# Patient Record
Sex: MALE | Race: WHITE | NOT HISPANIC OR LATINO | URBAN - METROPOLITAN AREA
[De-identification: names, ages, dates, MRNs, and addresses within clinical notes are randomized per-mention and may not be internally consistent; named-entity substitution may affect disease eponyms.]

---

## 2022-12-27 ENCOUNTER — EMERGENCY (EMERGENCY)
Facility: HOSPITAL | Age: 62
LOS: 1 days | Discharge: HOME | End: 2022-12-27
Attending: EMERGENCY MEDICINE | Admitting: EMERGENCY MEDICINE
Payer: COMMERCIAL

## 2022-12-27 VITALS
SYSTOLIC BLOOD PRESSURE: 100 MMHG | OXYGEN SATURATION: 100 % | RESPIRATION RATE: 16 BRPM | HEART RATE: 82 BPM | DIASTOLIC BLOOD PRESSURE: 49 MMHG | TEMPERATURE: 98 F

## 2022-12-27 VITALS
SYSTOLIC BLOOD PRESSURE: 105 MMHG | HEIGHT: 65.75 IN | DIASTOLIC BLOOD PRESSURE: 68 MMHG | WEIGHT: 315 LBS | TEMPERATURE: 98 F | RESPIRATION RATE: 18 BRPM | HEART RATE: 81 BPM

## 2022-12-27 LAB
ALBUMIN SERPL ELPH-MCNC: 4.4 G/DL — SIGNIFICANT CHANGE UP (ref 3.3–5)
ALP SERPL-CCNC: 97 U/L — SIGNIFICANT CHANGE UP (ref 40–120)
ALT FLD-CCNC: 11 U/L — SIGNIFICANT CHANGE UP (ref 10–45)
ANION GAP SERPL CALC-SCNC: 14 MMOL/L — SIGNIFICANT CHANGE UP (ref 5–17)
APTT BLD: 32.8 SEC — SIGNIFICANT CHANGE UP (ref 27.5–35.5)
AST SERPL-CCNC: 14 U/L — SIGNIFICANT CHANGE UP (ref 10–40)
BASOPHILS # BLD AUTO: 0.06 K/UL — SIGNIFICANT CHANGE UP (ref 0–0.2)
BASOPHILS NFR BLD AUTO: 0.6 % — SIGNIFICANT CHANGE UP (ref 0–2)
BILIRUB SERPL-MCNC: 0.4 MG/DL — SIGNIFICANT CHANGE UP (ref 0.2–1.2)
BUN SERPL-MCNC: 52 MG/DL — HIGH (ref 7–23)
CALCIUM SERPL-MCNC: 9.6 MG/DL — SIGNIFICANT CHANGE UP (ref 8.4–10.5)
CHLORIDE SERPL-SCNC: 98 MMOL/L — SIGNIFICANT CHANGE UP (ref 96–108)
CO2 SERPL-SCNC: 26 MMOL/L — SIGNIFICANT CHANGE UP (ref 22–31)
CREAT SERPL-MCNC: 2.34 MG/DL — HIGH (ref 0.5–1.3)
EGFR: 31 ML/MIN/1.73M2 — LOW
EOSINOPHIL # BLD AUTO: 1.32 K/UL — HIGH (ref 0–0.5)
EOSINOPHIL NFR BLD AUTO: 12.5 % — HIGH (ref 0–6)
GLUCOSE SERPL-MCNC: 103 MG/DL — HIGH (ref 70–99)
HCT VFR BLD CALC: 43.9 % — SIGNIFICANT CHANGE UP (ref 39–50)
HGB BLD-MCNC: 14.7 G/DL — SIGNIFICANT CHANGE UP (ref 13–17)
IMM GRANULOCYTES NFR BLD AUTO: 0.7 % — SIGNIFICANT CHANGE UP (ref 0–0.9)
INR BLD: 1.11 — SIGNIFICANT CHANGE UP (ref 0.88–1.16)
LYMPHOCYTES # BLD AUTO: 1.99 K/UL — SIGNIFICANT CHANGE UP (ref 1–3.3)
LYMPHOCYTES # BLD AUTO: 18.9 % — SIGNIFICANT CHANGE UP (ref 13–44)
MCHC RBC-ENTMCNC: 29.5 PG — SIGNIFICANT CHANGE UP (ref 27–34)
MCHC RBC-ENTMCNC: 33.5 GM/DL — SIGNIFICANT CHANGE UP (ref 32–36)
MCV RBC AUTO: 88 FL — SIGNIFICANT CHANGE UP (ref 80–100)
MONOCYTES # BLD AUTO: 0.72 K/UL — SIGNIFICANT CHANGE UP (ref 0–0.9)
MONOCYTES NFR BLD AUTO: 6.8 % — SIGNIFICANT CHANGE UP (ref 2–14)
NEUTROPHILS # BLD AUTO: 6.38 K/UL — SIGNIFICANT CHANGE UP (ref 1.8–7.4)
NEUTROPHILS NFR BLD AUTO: 60.5 % — SIGNIFICANT CHANGE UP (ref 43–77)
NRBC # BLD: 0 /100 WBCS — SIGNIFICANT CHANGE UP (ref 0–0)
PLATELET # BLD AUTO: 226 K/UL — SIGNIFICANT CHANGE UP (ref 150–400)
POTASSIUM SERPL-MCNC: 3.8 MMOL/L — SIGNIFICANT CHANGE UP (ref 3.5–5.3)
POTASSIUM SERPL-SCNC: 3.8 MMOL/L — SIGNIFICANT CHANGE UP (ref 3.5–5.3)
PROT SERPL-MCNC: 7.9 G/DL — SIGNIFICANT CHANGE UP (ref 6–8.3)
PROTHROM AB SERPL-ACNC: 13.2 SEC — SIGNIFICANT CHANGE UP (ref 10.5–13.4)
RBC # BLD: 4.99 M/UL — SIGNIFICANT CHANGE UP (ref 4.2–5.8)
RBC # FLD: 13.9 % — SIGNIFICANT CHANGE UP (ref 10.3–14.5)
SARS-COV-2 RNA SPEC QL NAA+PROBE: NEGATIVE — SIGNIFICANT CHANGE UP
SODIUM SERPL-SCNC: 138 MMOL/L — SIGNIFICANT CHANGE UP (ref 135–145)
WBC # BLD: 10.54 K/UL — HIGH (ref 3.8–10.5)
WBC # FLD AUTO: 10.54 K/UL — HIGH (ref 3.8–10.5)

## 2022-12-27 PROCEDURE — 93010 ELECTROCARDIOGRAM REPORT: CPT

## 2022-12-27 PROCEDURE — 85730 THROMBOPLASTIN TIME PARTIAL: CPT

## 2022-12-27 PROCEDURE — 99285 EMERGENCY DEPT VISIT HI MDM: CPT

## 2022-12-27 PROCEDURE — 85025 COMPLETE CBC W/AUTO DIFF WBC: CPT

## 2022-12-27 PROCEDURE — 93005 ELECTROCARDIOGRAM TRACING: CPT

## 2022-12-27 PROCEDURE — 80053 COMPREHEN METABOLIC PANEL: CPT

## 2022-12-27 PROCEDURE — 36415 COLL VENOUS BLD VENIPUNCTURE: CPT

## 2022-12-27 PROCEDURE — 82962 GLUCOSE BLOOD TEST: CPT

## 2022-12-27 PROCEDURE — 85610 PROTHROMBIN TIME: CPT

## 2022-12-27 PROCEDURE — 99284 EMERGENCY DEPT VISIT MOD MDM: CPT

## 2022-12-27 PROCEDURE — 87635 SARS-COV-2 COVID-19 AMP PRB: CPT

## 2022-12-27 RX ORDER — SODIUM CHLORIDE 9 MG/ML
1000 INJECTION INTRAMUSCULAR; INTRAVENOUS; SUBCUTANEOUS ONCE
Refills: 0 | Status: COMPLETED | OUTPATIENT
Start: 2022-12-27 | End: 2022-12-27

## 2022-12-27 RX ADMIN — SODIUM CHLORIDE 1000 MILLILITER(S): 9 INJECTION INTRAMUSCULAR; INTRAVENOUS; SUBCUTANEOUS at 15:29

## 2022-12-27 NOTE — ED PROVIDER NOTE - CONSTITUTIONAL, MLM
Morbidly obese and weak appearing but non toxic, awake, alert, oriented to person, place, time/situation and in no apparent distress. normal...

## 2022-12-27 NOTE — ED PROVIDER NOTE - MDM ORDERS SUBMITTED SELECTION
cotn with antibiotics   f/u with Dr. Galarza  Return to ER /follow up with Primary if symptoms don't resolve or worsen
Labs/EKG

## 2022-12-27 NOTE — ED PROVIDER NOTE - HIV OFFER
----- Message from Siva Santos MD sent at 5/8/2018  2:17 PM CDT -----  INr in acceptable range at 1.9  
INR (no units)   Date Value   05/08/2018 1.9     Note changes to patient anticoag check:  patient reports 2 tablets on MWF and 1 tablet all other days, and  Denies alternating days (as listed on anticoag calender)  Takes same every week.   Missed 5/6/18 dose.     Advised patient to continue same regimen and recheck inr 6/5/18      Patient was notified that their INR result was 1.9.   Patient was instructed to continue taking their Coumadin/Warfarin as follows: continue current regimen (2 tablets on MWF and 1 tablet all other days)  Next INR is due in 4 weeks on 6/5/18.  Patient was instructed to contact us with any unusual bleeding, bruising, any changes in medications, diet or health status and if there are any other questions or concerns.   Patient verbalized understanding of above.      ACC questions answered as negative except one missed dose on 5/6/18  Anticoag calender updated    Please advise if changes should be made  
Left message to call back   
Opt out

## 2022-12-27 NOTE — ED PROVIDER NOTE - CLINICAL SUMMARY MEDICAL DECISION MAKING FREE TEXT BOX
63 y/o M with dizziness and weakness mostly upon standing. Pt has intentional 40 pound weight loss in a couple of months. Pt feels dehydrated, vitals signs are stable. Labs reviewed with a white count of 10, BUN of 52 and creatine of 2.34, this could be prerenal and secondary to dehydration. Pt given fluids and will reevaluate, if improved can discharge. 63 y/o M with dizziness and weakness mostly upon standing. Pt has intentional 40 pound weight loss in a couple of months. Pt feels dehydrated, vitals signs are stable. Labs reviewed with a white count of 10, BUN of 52 and creatine of 2.34, this could be prerenal and secondary to dehydration. Pt given fluids and will reevaluate, if improved can discharge.  Pt understands to follow up with PMD for repeat creatinine.

## 2022-12-27 NOTE — ED PROVIDER NOTE - NSFOLLOWUPINSTRUCTIONS_ED_ALL_ED_FT
Stay hydrated and eat well balanced meals.  Hold lasix for several days to improve kidney function.  Follow up with your PMD for repeat labs including creatinine to ensure improvement in kidney function.  Return to ED with any worsening symptoms or other concerns.        Dehydration, Adult      Dehydration is a condition in which there is not enough water or other fluids in the body. This happens when a person loses more fluids than he or she takes in. Important organs, such as the kidneys, brain, and heart, cannot function without a proper amount of fluids. Any loss of fluids from the body can lead to dehydration.    Dehydration can be mild, moderate, or severe. It should be treated right away to prevent it from becoming severe.      What are the causes?    Dehydration may be caused by:  •Conditions that cause loss of water or other fluids, such as diarrhea, vomiting, or sweating or urinating a lot.      •Not drinking enough fluids, especially when you are ill or doing activities that require a lot of energy.      •Other illnesses and conditions, such as fever or infection.      •Certain medicines, such as medicines that remove excess fluid from the body (diuretics).      •Lack of safe drinking water.      •Not being able to get enough water and food.        What increases the risk?    The following factors may make you more likely to develop this condition:  •Having a long-term (chronic) illness that has not been treated properly, such as diabetes, heart disease, or kidney disease.      •Being 65 years of age or older.      •Having a disability.      •Living in a place that is high in altitude, where thinner, drier air causes more fluid loss.      •Doing exercises that put stress on your body for a long time (endurance sports).        What are the signs or symptoms?    Symptoms of dehydration depend on how severe it is.    Mild or moderate dehydration     •Thirst.      •Dry lips or dry mouth.      •Dizziness or light-headedness, especially when standing up from a seated position.       •Muscle cramps.       •Dark urine. Urine may be the color of tea.       •Less urine or tears produced than usual.      •Headache.      Severe dehydration     •Changes in skin. Your skin may be cold and clammy, blotchy, or pale. Your skin also may not return to normal after being lightly pinched and released.      •Little or no tears, urine, or sweat.      •Changes in vital signs, such as rapid breathing and low blood pressure. Your pulse may be weak or may be faster than 100 beats a minute when you are sitting still.    •Other changes, such as:  •Feeling very thirsty.      •Sunken eyes.      •Cold hands and feet.      •Confusion.      •Being very tired (lethargic) or having trouble waking from sleep.      •Short-term weight loss.      •Loss of consciousness.          How is this diagnosed?    This condition is diagnosed based on your symptoms and a physical exam. You may have blood and urine tests to help confirm the diagnosis.      How is this treated?    Treatment for this condition depends on how severe it is. Treatment should be started right away. Do not wait until dehydration becomes severe. Severe dehydration is an emergency and needs to be treated in a hospital.•Mild or moderate dehydration can be treated at home. You may be asked to:   •Drink more fluids.      •Drink an oral rehydration solution (ORS). This drink helps restore proper amounts of fluids and salts and minerals in the blood (electrolytes).      •Severe dehydration can be treated:  •With IV fluids.      •By correcting abnormal levels of electrolytes. This is often done by giving electrolytes through a tube that is passed through your nose and into your stomach (nasogastric tube, or NG tube).      •By treating the underlying cause of dehydration.          Follow these instructions at home:    Oral rehydration solution     If told by your health care provider, drink an ORS:  •Make an ORS by following instructions on the package.      •Start by drinking small amounts, about ½ cup (120 mL) every 5–10 minutes.      •Slowly increase how much you drink until you have taken the amount recommended by your health care provider.        Eating and drinking                    •Drink enough clear fluid to keep your urine pale yellow. If you were told to drink an ORS, finish the ORS first and then start slowly drinking other clear fluids. Drink fluids such as:  •Water. Do not drink only water. Doing that can lead to hyponatremia, which is having too little salt (sodium) in the body.      •Water from ice chips you suck on.      •Fruit juice that you have added water to (diluted fruit juice).      •Low-calorie sports drinks.        •Eat foods that contain a healthy balance of electrolytes, such as bananas, oranges, potatoes, tomatoes, and spinach.      •Do not drink alcohol.    •Avoid the following:  •Drinks that contain a lot of sugar. These include high-calorie sports drinks, fruit juice that is not diluted, and soda.      •Caffeine.      •Foods that are greasy or contain a lot of fat or sugar.        General instructions    •Take over-the-counter and prescription medicines only as told by your health care provider.      •Do not take sodium tablets. Doing that can lead to having too much sodium in the body (hypernatremia).      •Return to your normal activities as told by your health care provider. Ask your health care provider what activities are safe for you.      •Keep all follow-up visits as told by your health care provider. This is important.        Contact a health care provider if:  •You have muscle cramps, pain, or discomfort, such as:  •Pain in your abdomen and the pain gets worse or stays in one area (localizes).      •Stiff neck.        •You have a rash.      •You are more irritable than usual.      •You are sleepier or have a harder time waking than usual.      •You feel weak or dizzy.      •You feel very thirsty.        Get help right away if you have:    •Any symptoms of severe dehydration.    •Symptoms of vomiting, such as:  •You cannot eat or drink without vomiting.      •Vomiting gets worse or does not go away.      •Vomit includes blood or green matter (bile).        •Symptoms that get worse with treatment.      •A fever.      •A severe headache.    •Problems with urination or bowel movements, such as:  •Diarrhea that gets worse or does not go away.      •Blood in your stool (feces). This may cause stool to look black and tarry.      •Not urinating, or urinating only a small amount of very dark urine, within 6–8 hours.        •Trouble breathing.      These symptoms may represent a serious problem that is an emergency. Do not wait to see if the symptoms will go away. Get medical help right away. Call your local emergency services (911 in the [place] Do not drive yourself to the hospital.       Summary    •Dehydration is a condition in which there is not enough water or other fluids in the body. This happens when a person loses more fluids than he or she takes in.      •Treatment for this condition depends on how severe it is. Treatment should be started right away. Do not wait until dehydration becomes severe.      •Drink enough clear fluid to keep your urine pale yellow. If you were told to drink an oral rehydration solution (ORS), finish the ORS first and then start slowly drinking other clear fluids.      •Take over-the-counter and prescription medicines only as told by your health care provider.      •Get help right away if you have any symptoms of severe dehydration.      This information is not intended to replace advice given to you by your health care provider. Make sure you discuss any questions you have with your health care provider.      Document Revised: 07/30/2020 Document Reviewed: 07/30/2020    [employer] Patient Education © 2022 [employer]

## 2022-12-27 NOTE — ED PROVIDER NOTE - CARE PLAN
1 Principal Discharge DX:	Dehydration  Secondary Diagnosis:	Dizziness  Secondary Diagnosis:	DAMIEN (acute kidney injury)

## 2022-12-27 NOTE — ED ADULT NURSE NOTE - OBJECTIVE STATEMENT
.  62years male alert mental state (AOX3) received on foot.  -Allergy: N/A.  -complain of dizziness.    Pt is in the bed comfortably at this time. Will continue to monitor and document any changes. .  62years male alert mental state (AOX3) received on foot.  -Allergy: N/A.  -complain of dizziness.  Hx of HTN. pt started dizziness, weakness and tinnitus for several days.   -denied chest pain, SOB, abdomen pain, n/v/d, fever  Pt is in the bed comfortably at this time. Will continue to monitor and document any changes.

## 2022-12-27 NOTE — ED PROVIDER NOTE - PATIENT PORTAL LINK FT
You can access the FollowMyHealth Patient Portal offered by [facility] by registering at the following website: [email or web address]. By joining [facility]’s FollowMyHealth portal, you will also be able to view your health information using other applications (apps) compatible with our system.

## 2022-12-27 NOTE — ED PROVIDER NOTE - OBJECTIVE STATEMENT
63 y/o M with a PMHx of BPH, HTN, DM on Ozempic for weight loss purposes, lost 40lbs presents to the ED c/o dizziness. Pt is from [place] and had traveled from [place] to [place] on Dec 22nd but on Dec 21 pt reports feeling weakness and dizziness especially upon standing. Pt has had a history of vertigo but he reports the current symptoms do not feel like that now. Pt also endorses ringing in both ears but no associated positional changes, nausea, vomiting, fever, infectious symptoms, CP, SOB, unilateral leg pain or swelling, prior history of blood clots. Pt states his symptoms has happened before when he felt very dehydrated.

## 2022-12-29 DIAGNOSIS — E86.0 DEHYDRATION: ICD-10-CM

## 2022-12-29 DIAGNOSIS — H93.13 TINNITUS, BILATERAL: ICD-10-CM

## 2022-12-29 DIAGNOSIS — Z20.822 CONTACT WITH AND (SUSPECTED) EXPOSURE TO COVID-19: ICD-10-CM

## 2022-12-29 DIAGNOSIS — E11.9 TYPE 2 DIABETES MELLITUS WITHOUT COMPLICATIONS: ICD-10-CM

## 2022-12-29 DIAGNOSIS — N40.0 BENIGN PROSTATIC HYPERPLASIA WITHOUT LOWER URINARY TRACT SYMPTOMS: ICD-10-CM

## 2022-12-29 DIAGNOSIS — Z79.85 LONG-TERM (CURRENT) USE OF INJECTABLE NON-INSULIN ANTIDIABETIC DRUGS: ICD-10-CM

## 2022-12-29 DIAGNOSIS — R42 DIZZINESS AND GIDDINESS: ICD-10-CM

## 2022-12-29 DIAGNOSIS — N17.9 ACUTE KIDNEY FAILURE, UNSPECIFIED: ICD-10-CM

## 2022-12-29 DIAGNOSIS — I10 ESSENTIAL (PRIMARY) HYPERTENSION: ICD-10-CM

## 2022-12-29 DIAGNOSIS — E66.01 MORBID (SEVERE) OBESITY DUE TO EXCESS CALORIES: ICD-10-CM
